# Patient Record
Sex: MALE | Race: WHITE
[De-identification: names, ages, dates, MRNs, and addresses within clinical notes are randomized per-mention and may not be internally consistent; named-entity substitution may affect disease eponyms.]

---

## 2021-11-30 ENCOUNTER — HOSPITAL ENCOUNTER (OUTPATIENT)
Dept: HOSPITAL 75 - CARD | Age: 61
End: 2021-11-30
Attending: INTERNAL MEDICINE
Payer: COMMERCIAL

## 2021-11-30 VITALS — HEIGHT: 68.9 IN | BODY MASS INDEX: 23.84 KG/M2 | WEIGHT: 160.94 LBS

## 2021-11-30 VITALS — SYSTOLIC BLOOD PRESSURE: 131 MMHG | DIASTOLIC BLOOD PRESSURE: 80 MMHG

## 2021-11-30 DIAGNOSIS — I51.7: Primary | ICD-10-CM

## 2021-11-30 PROCEDURE — 93306 TTE W/DOPPLER COMPLETE: CPT

## 2021-11-30 PROCEDURE — 93017 CV STRESS TEST TRACING ONLY: CPT

## 2021-11-30 PROCEDURE — 78452 HT MUSCLE IMAGE SPECT MULT: CPT

## 2021-12-01 NOTE — STRESS TEST
DATE OF SERVICE:  11/30/2021



RESTING AND POST EXERCISE TECHNETIUM-99M TETROFOSMIN SPECT CT IMAGING



CLINICAL DIAGNOSIS:

Chest discomfort.



ORDERING PHYSICIAN:

Dr. Chan.



PRIMARY PHYSICIAN:

Dr. Curran.



Baseline images were carried out after injection of 10.63 mCi of technetium-99m

Tetrofosmin.  This was followed by exercise on a treadmill.  Car protocol was

employed.  The electrocardiogram showed sinus rhythm and right bundle branch

block at baseline.  Heart rate and blood pressure responses to exercise were

normal.  A few isolated premature ventricular contractions were seen.  There was

no ventricular or supraventricular tachycardia.  There is considerable baseline

artifact at peak exercise.  ST segments cannot be interpreted for ischemia.  The

patient received 33 mCi of technetium-99m Tetrofosmin after he had attained more

than 85% of maximum predicted heart rate and the exercise was continued for

another minute.  He tolerated the procedure well.  Test was stopped on account

of fatigue.  He exercised for a total of 13 minutes and 27 seconds.  He attained

13.9 METS of workload.  Review of images at rest and following stress indicates

a small basal inferior perfusion defect.  Gated images show normal global left

ventricular systolic function with normal regional wall motion.  Left

ventricular ejection fraction is calculated to be 52%.  Left ventricular

end-diastolic volume is 77 mL.  TID is absent (0.95).



CONCLUSIONS:

1.  This study is suggestive of a small amount of basal inferior ischemia.

2.  Normal regional wall motion.

3.  Normal global left ventricular systolic function with a calculated ejection

fraction of 52%.





Job ID: [id number]

DocumentID: [id number]

Dictated Date:  12/01/2021 09:13:30

Transcription Date: 12/01/2021 10:37:26

Dictated By: SHANTAL CHAN MD, MA, FACP, FACC,

## 2021-12-07 ENCOUNTER — HOSPITAL ENCOUNTER (OUTPATIENT)
Dept: HOSPITAL 75 - ICU | Age: 61
LOS: 1 days | Discharge: HOME | End: 2021-12-08
Attending: INTERNAL MEDICINE
Payer: COMMERCIAL

## 2021-12-07 VITALS — WEIGHT: 158.73 LBS | HEIGHT: 68.9 IN | BODY MASS INDEX: 23.51 KG/M2

## 2021-12-07 VITALS — SYSTOLIC BLOOD PRESSURE: 125 MMHG | DIASTOLIC BLOOD PRESSURE: 84 MMHG

## 2021-12-07 VITALS — SYSTOLIC BLOOD PRESSURE: 124 MMHG | DIASTOLIC BLOOD PRESSURE: 80 MMHG

## 2021-12-07 VITALS — DIASTOLIC BLOOD PRESSURE: 84 MMHG | SYSTOLIC BLOOD PRESSURE: 123 MMHG

## 2021-12-07 VITALS — SYSTOLIC BLOOD PRESSURE: 124 MMHG | DIASTOLIC BLOOD PRESSURE: 83 MMHG

## 2021-12-07 VITALS — SYSTOLIC BLOOD PRESSURE: 120 MMHG | DIASTOLIC BLOOD PRESSURE: 86 MMHG

## 2021-12-07 VITALS — SYSTOLIC BLOOD PRESSURE: 110 MMHG | DIASTOLIC BLOOD PRESSURE: 50 MMHG

## 2021-12-07 VITALS — SYSTOLIC BLOOD PRESSURE: 122 MMHG | DIASTOLIC BLOOD PRESSURE: 86 MMHG

## 2021-12-07 DIAGNOSIS — Z79.899: ICD-10-CM

## 2021-12-07 DIAGNOSIS — I25.82: ICD-10-CM

## 2021-12-07 DIAGNOSIS — Z79.82: ICD-10-CM

## 2021-12-07 DIAGNOSIS — I25.10: Primary | ICD-10-CM

## 2021-12-07 DIAGNOSIS — R07.89: ICD-10-CM

## 2021-12-07 DIAGNOSIS — Z11.2: ICD-10-CM

## 2021-12-07 DIAGNOSIS — E78.5: ICD-10-CM

## 2021-12-07 DIAGNOSIS — Z82.49: ICD-10-CM

## 2021-12-07 LAB
ALBUMIN SERPL-MCNC: 4.3 GM/DL (ref 3.2–4.5)
ALP SERPL-CCNC: 65 U/L (ref 40–136)
ALT SERPL-CCNC: 20 U/L (ref 0–55)
APTT BLD: 27 SEC (ref 24–35)
BILIRUB SERPL-MCNC: 1 MG/DL (ref 0.1–1)
BUN/CREAT SERPL: 18
CALCIUM SERPL-MCNC: 9.4 MG/DL (ref 8.5–10.1)
CHLORIDE SERPL-SCNC: 104 MMOL/L (ref 98–107)
CHOLEST SERPL-MCNC: 197 MG/DL (ref ?–200)
CO2 SERPL-SCNC: 26 MMOL/L (ref 21–32)
CREAT SERPL-MCNC: 1.2 MG/DL (ref 0.6–1.3)
GFR SERPLBLD BASED ON 1.73 SQ M-ARVRAT: 61 ML/MIN
GLUCOSE SERPL-MCNC: 104 MG/DL (ref 70–105)
HCT VFR BLD CALC: 48 % (ref 40–54)
HDLC SERPL-MCNC: 67 MG/DL (ref 40–60)
HGB BLD-MCNC: 15.8 G/DL (ref 13.3–17.7)
INR PPP: 1 (ref 0.8–1.4)
MCH RBC QN AUTO: 32 PG (ref 25–34)
MCHC RBC AUTO-ENTMCNC: 33 G/DL (ref 32–36)
MCV RBC AUTO: 98 FL (ref 80–99)
PLATELET # BLD: 269 10^3/UL (ref 130–400)
PMV BLD AUTO: 9.5 FL (ref 9–12.2)
POTASSIUM SERPL-SCNC: 4.1 MMOL/L (ref 3.6–5)
PROT SERPL-MCNC: 7 GM/DL (ref 6.4–8.2)
PROTHROMBIN TIME: 13.4 SEC (ref 12.2–14.7)
SODIUM SERPL-SCNC: 141 MMOL/L (ref 135–145)
TRIGL SERPL-MCNC: 60 MG/DL (ref ?–150)
VLDLC SERPL CALC-MCNC: 12 MG/DL (ref 5–40)
WBC # BLD AUTO: 6.5 10^3/UL (ref 4.3–11)

## 2021-12-07 PROCEDURE — 80048 BASIC METABOLIC PNL TOTAL CA: CPT

## 2021-12-07 PROCEDURE — 80061 LIPID PANEL: CPT

## 2021-12-07 PROCEDURE — 85610 PROTHROMBIN TIME: CPT

## 2021-12-07 PROCEDURE — 36415 COLL VENOUS BLD VENIPUNCTURE: CPT

## 2021-12-07 PROCEDURE — 87081 CULTURE SCREEN ONLY: CPT

## 2021-12-07 PROCEDURE — 85730 THROMBOPLASTIN TIME PARTIAL: CPT

## 2021-12-07 PROCEDURE — 80053 COMPREHEN METABOLIC PANEL: CPT

## 2021-12-07 PROCEDURE — 93005 ELECTROCARDIOGRAM TRACING: CPT

## 2021-12-07 PROCEDURE — 85027 COMPLETE CBC AUTOMATED: CPT

## 2021-12-07 PROCEDURE — 93458 L HRT ARTERY/VENTRICLE ANGIO: CPT

## 2021-12-07 RX ADMIN — FAMOTIDINE SCH MG: 20 TABLET, FILM COATED ORAL at 20:10

## 2021-12-07 RX ADMIN — SODIUM CHLORIDE SCH MLS/HR: 900 INJECTION, SOLUTION INTRAVENOUS at 14:49

## 2021-12-07 NOTE — CARDIAC PROCEDURE NOTE-CS/ASA
Pre-Procedure Note


Pre-Op Procedure Note


H&P Reviewed


The H&P was reviewed, patient examined and no changes noted.


Date H&P Reviewed:  Dec 7, 2021


Time H&P Reviewed:  10:38





Conscious Sedation Pre-Proced


Time


10:38





ASA Score


3


For ASA 3 and 4: Consider anesthesia and medical clearance. Also, for patients

with a history of failed moderate sedation consider anesthesia.

















Airway 


 


Lungs 


 


Heart 


 


 ASA score


 


 ASA 1: a normal healthy patient


 


 ASA 2:  a patient with a mild systemic disease (mid diabetes, controlled 

hypertension, obesity 


 


 ASA 3:  a patient with a severe systemic disease that limits activity  (angina,

COPD, prior Myocardial infarction)


 


 ASA 4:  a patient with an incapacitating disease that is a constant threat to 

life (CHF, renal failure)


 


 ASA 5:  a moribund patient not expected to survive 24 hrs.  (ruptured aneurysm)


 


 ASA 6:  a declared brain-dead patient whose organs are being harvested.


 


 For emergent operations, add the letter E after the classification











Mallampati Classification


Grade 2





Sedation Plan


Analgesia, Amnesia, Plan communicated to team members, Discussed options with 

patient/fam, Discussed risks with patient/fam


The patient is an appropriate candidate to undergo the planned procedure, 

sedation, and anesthesia.





The patient immediately re-assessed prior to indication.











SHANTAL FAITH MD FACP FAC CCDS    Dec 7, 2021 10:38

## 2021-12-07 NOTE — CARDIAC CATHETERIZATION
DATE OF SERVICE:  12/07/2021



CARDIAC CATHETERIZATION AND CORONARY INTERVENTION REPORT



INDICATION FOR PROCEDURE:

The patient is a 61-year-old gentleman, who has been experiencing chest

discomfort.  A myocardial perfusion imaging study indicated inferior ischemia. 

His symptoms have continued and have recently gotten worse.  Cardiac

catheterization was recommended.  Informed consent was obtained for cardiac

catheterization and possible ad hoc coronary intervention.



DESCRIPTION OF PROCEDURE:

He was brought to the cardiac catheterization laboratory in a fasting state. 

Right groin was prepared and draped in the usual sterile fashion.  Lidocaine 1%

was used as local anesthesia.  Modified Seldinger technique was used to advance

a 5-Irish sheath in the right femoral artery, 5-Irish JL4 catheter was used

for left coronary angiography, and 5-Irish JR4 catheter was used for right

coronary angiography, and 5-Irish pigtail catheter was used for left heart

catheterization.  Left ventricular angiography was not performed.  This was to

conserve contrast because the patient needed coronary intervention. 

Subsequently, we carried out percutaneous interventions to the left circumflex

and the left anterior descending arteries and it is described below.



PERCUTANEOUS INTERVENTION TO THE LEFT CIRCUMFLEX ARTERY:

The left circumflex artery was exhibiting approximately 80% stenosis in the mid

portion of a large obtuse marginal branch.  We exchanged the sheath over a wire

for a 6-Irish sheath.  We gave a double bolus of Integrilin.  We gave 5000

units of intravenous heparin.  We used a 6-Irish JL4 guide catheter to engage

the left coronary artery.  We advanced a BMW wire across the lesion in the

diagonal branch and the tip was placed in the distal vessel.  The stent to the

lesion was Skypoint 2.25 x 15 mm stent that was deployed at 14 atmospheres. 

Subsequent angiography revealed no significant residual stenosis and flow

throughout the vessel is normal.  The wire and the balloon were removed.



PERCUTANEOUS INTERVENTION TO THE LEFT ANTERIOR DESCENDING:

The left anterior descending artery had a 70% stenosis in its mid portion.  We

kept the JL4 guide catheter.  We advanced a BMW wire across the lesion and the

tip was placed in the distal vessel.  We stented the lesion with a Skypoint 2.25

x 15 mm stent that was deployed at 20 atmospheres.  Subsequent angiography

reveals no significant residual stenosis.



HEMODYNAMICS:

Left ventricular end-diastolic pressure following coronary angiography was 11

mmHg.  There is no significant pressure gradient on pullback across the aortic

valve.  Ascending aortic pressure was 116/64 with a mean of 83 mmHg.



CORONARY ANGIOGRAPHY:

Coronary calcification is present in all vessels.  Left main coronary artery

does not exhibit significant disease.  Left anterior descending artery had 70%

mid vessel stenosis that was successfully stented with Skypoint 2.25 x 15 mm

stent with no significant residual stenosis.  The proximal left anterior

descending artery has approximately 30% to 40% stenosis.  The first diagonal

branch is of a very small caliber and exhibits 99% to 100% stenosis at its

ostium.  The left circumflex artery has 40% proximal stenosis and approximately

50% to 60% distal stenosis following the origin of a large obtuse marginal.  The

obtuse marginal had 80% mid vessel stenosis that was successfully stented with

Skypoint 2.25 x 15 mm stent.  The right coronary artery is chronically and

totally occluded in its mid and distal portions.  The posterior descending

branch is heavily collateralized from the left coronary system.



CONCLUSIONS:

1.  Three-vessel coronary artery disease.  The left anterior descending artery

had 70% mid vessel stenosis that was stented with Skypoint 2.25 x 15 mm stent. 

The proximal left anterior descending has mild to moderate disease.  A very

small caliber diagonal is subtotally occluded in its ostial portion and is not

amenable to intervention.  The left circumflex artery has a 40% proximal and 50%

to 60% distal stenosis.  A large obtuse marginal branch of the left circumflex

had 80% mid vessel stenosis that was successfully stented with a Skypoint 2.25 x

15 mm stent.  The right coronary artery is chronically and totally occluded and

is collateralized by the left coronary system.

2.  Normal left ventricular end-diastolic pressure.



DISCUSSION AND RECOMMENDATIONS:

Risk factor modification has been reviewed.  Dual antiplatelet therapy is being

initiated.  Statin therapy will be given as tolerated.





Job ID: [id number]

DocumentID: [id number]

Dictated Date:  12/07/2021 11:33:08

Transcription Date: 12/07/2021 13:44:58

Dictated By: SHANTAL FAITH MD, MA, FACP, FACC,

MTDD

## 2021-12-08 VITALS — SYSTOLIC BLOOD PRESSURE: 129 MMHG | DIASTOLIC BLOOD PRESSURE: 88 MMHG

## 2021-12-08 LAB
BUN/CREAT SERPL: 18
CALCIUM SERPL-MCNC: 9 MG/DL (ref 8.5–10.1)
CHLORIDE SERPL-SCNC: 106 MMOL/L (ref 98–107)
CO2 SERPL-SCNC: 21 MMOL/L (ref 21–32)
CREAT SERPL-MCNC: 0.89 MG/DL (ref 0.6–1.3)
GFR SERPLBLD BASED ON 1.73 SQ M-ARVRAT: 87 ML/MIN
GLUCOSE SERPL-MCNC: 105 MG/DL (ref 70–105)
HCT VFR BLD CALC: 46 % (ref 40–54)
HGB BLD-MCNC: 15.1 G/DL (ref 13.3–17.7)
MCH RBC QN AUTO: 32 PG (ref 25–34)
MCHC RBC AUTO-ENTMCNC: 33 G/DL (ref 32–36)
MCV RBC AUTO: 97 FL (ref 80–99)
PLATELET # BLD: 240 10^3/UL (ref 130–400)
PMV BLD AUTO: 10.1 FL (ref 9–12.2)
POTASSIUM SERPL-SCNC: 3.8 MMOL/L (ref 3.6–5)
SODIUM SERPL-SCNC: 138 MMOL/L (ref 135–145)
WBC # BLD AUTO: 12 10^3/UL (ref 4.3–11)

## 2021-12-08 RX ADMIN — FAMOTIDINE SCH MG: 20 TABLET, FILM COATED ORAL at 09:24

## 2021-12-08 RX ADMIN — SODIUM CHLORIDE SCH MLS/HR: 900 INJECTION, SOLUTION INTRAVENOUS at 00:36

## 2021-12-08 NOTE — PROGRESS NOTE - CARDIOLOGY
Cardiology SOAP Progress Note


Objective:


I&O/Vital Signs








Results/Procedures:


Labs





Microbiology


12/7/21 MRSA Screen - Final, Complete


          MRSA not isolated








A/P:


Assessment:


CAD


- MPI 11-30-21: This study is suggestive of a small amount of basal inferior 

ischemia. Normal regional wall motion. Normal global left ventricular systolic 

function with a calculated ejection fraction of 52%.


- Subsequent cardiac cath on 12-7-21:   Three-vessel coronary artery disease.  

The left anterior descending artery had 70% mid vessel stenosis that was stented

with Skypoint 2.25 x 15 mm stent. The proximal left anterior descending has mild

to moderate disease.  A very small caliber diagonal is subtotally occluded in 

its ostial portion and is not amenable to intervention.  The left circumflex 

artery has a 40% proximal and 50% to 60% distal stenosis.  A large obtuse 

marginal branch of the left circumflex had 80% mid vessel stenosis that was 

successfully stented with a Skypoint 2.25 x 15 mm stent.  The right coronary 

artery is chronically and totally occluded and is collateralized by the left 

coronary system. Normal left ventricular end-diastolic pressure.


- Echocardiogram of 11-30-21 showed LVEF 50%





Hyperlipidemia


- treated with statin, managed by Dr Curran





Abnormal ECG


-ECG of 11/5/21: NSR & RBBB





Fam h/o early CAD


- parents











ROXANA GRACIA            Dec 8, 2021 08:11

## 2021-12-08 NOTE — DISCHARGE INST-CARDIOLOGY
Discharge Inst-Cardiac


Discharge Medications


New Medications:  


Clopidogrel Bisulfate (Clopidogrel) 75 Mg Tablet


75 MG PO DAILY, #90 TAB 3 Refills





 


Continued Medications:  


Ascorbic Acid (Vitamin C) 1,000 Mg Tablet


1000 MG PO DAILY, TAB





Aspirin (Aspirin) 81 Mg Tab.chew


81 MG PO DAILY, TAB





Omega 3 Polyunsat Fatty Acids (Fish Oil 1,000 mg Capsule) 1,000 Mg Cap


1000 MG PO DAILY, CAP





Rosuvastatin Calcium (Rosuvastatin Calcium) 5 Mg Tablet


5 MG PO DAILY, TAB





Vitamin D (Vitamin D3) 10 Mcg Tablet


400 MCG PO DAILY, TAB





Zinc Gluconate (Zinc) 30 Mg Tablet


30 MG PO DAILY, TAB








New, Converted or Re-Newed RX:  Transmitted to Pharmacy





Patient Instructions


Patient Instructions:  


Please schedule follow up appointment to see Dr. Chan in 1-2 weeks











ROXANA GRACIA            Dec 8, 2021 10:41

## 2021-12-08 NOTE — PROGRESS NOTE - CARDIOLOGY
Cardiology SOAP Progress Note


Subjective:


No cp or palp or syncope or shortness of breath


No groin or leg discomfort


No swelling


No n/v/d





Objective:


I&O/Vital Signs











 12/8/21 12/8/21 12/8/21 12/8/21





 00:00 01:00 04:00 07:00


 


Temp 36.3  36.6 


 


Pulse  68  74


 


Resp 12  11 


 


B/P (MAP) 121/87  127/74 


 


Pulse Ox 96  93 


 


O2 Delivery Room Air  Room Air 


 


    





 12/8/21 12/8/21 12/8/21 12/8/21





 07:00 07:15 07:30 08:00


 


Pulse 76 74 75 78


 


Resp 14 16 13 23


 


B/P (MAP)    128/85


 


Pulse Ox    95


 


O2 Delivery Room Air Room Air Room Air Room Air





 12/8/21 12/8/21 12/8/21 12/8/21





 08:15 08:30 08:45 09:00


 


Pulse 78 77 76 74


 


Resp 16 16 9 19


 


Pulse Ox 98 98 100 100


 


O2 Delivery Room Air Room Air Room Air Room Air





 12/8/21   





 09:15   


 


Pulse 81   


 


Resp 33   


 


Pulse Ox 100   














 12/8/21





 00:00


 


Intake Total 350 ml


 


Balance 350 ml








Bruising:  mild bruising (at the site of groin sheath)


Constitutional:  AAO x 3, well-developed, well-nourished


Respiratory:  No accessory muscle use; other (good, bilat air entry)


Cardiovascular:  regular rate-rhythm, S1 and S2, systolic murmur (soft AMARA at 

card basee)


Gastrointestional:  No tender; soft; No guarding, No rebound; audible bowel 

sounds


Extremities:  No clubbing, No cyanosis, No significant edema


Neurologic/Psychiatric:  oriented x 3, other (moves all limbs equally)





Results/Procedures:


Labs


Laboratory Tests


12/8/21 05:10: 


Sodium Level 138, Potassium Level 3.8, Chloride Level 106, Carbon Dioxide Level 

21, Anion Gap 11, Blood Urea Nitrogen 16, Creatinine 0.89, Estimat Glomerular 

Filtration Rate 87, BUN/Creatinine Ratio 18, Glucose Level 105, Calcium Level 

9.0


12/8/21 05:12: 


White Blood Count 12.0H, Red Blood Count 4.76, Hemoglobin 15.1, Hematocrit 46, 

Mean Corpuscular Volume 97, Mean Corpuscular Hemoglobin 32, Mean Corpuscular 

Hemoglobin Concent 33, Red Cell Distribution Width 12.6, Platelet Count 240, 

Mean Platelet Volume 10.1





Microbiology


12/7/21 MRSA Screen - Final, Complete


          MRSA not isolated








A/P:


Assessment:


CAD


- MPI 11-30-21: This study is suggestive of a small amount of basal inferior 

ischemia. Normal regional wall motion. Normal global left ventricular systolic 

function with a calculated ejection fraction of 52%.


- Subsequent cardiac cath on 12-7-21:   Three-vessel coronary artery disease.  

The left anterior descending artery had 70% mid vessel stenosis that was stented

with Skypoint 2.25 x 15 mm stent. The proximal left anterior descending has mild

to moderate disease.  A very small caliber diagonal is subtotally occluded in 

its ostial portion and is not amenable to intervention.  The left circumflex 

artery has a 40% proximal and 50% to 60% distal stenoses.  A large obtuse 

marginal branch of the left circumflex had 80% mid vessel stenosis that was 

successfully stented with a Skypoint 2.25 x 15 mm stent.  The right coronary 

artery is chronically and totally occluded and is collateralized by the left 

coronary system. Normal left ventricular end-diastolic pressure.


- Echocardiogram of 11-30-21 showed LVEF 50%





Hyperlipidemia


- treated with statin, managed by Dr Curran





Abnormal ECG


-ECG of 11/5/21: NSR & RBBB





Fam h/o early CAD


- parents


Plan:





* I discussed the details of his card cath and interventions and answered 

  questions


* We reviewed the rationale of his current regimen and advised compliance


* Risk factor mod reviewed


* Outpt f/u advised











SHANTAL FAITH MD [facility]    Dec 8, 2021 09:42